# Patient Record
(demographics unavailable — no encounter records)

---

## 2025-02-10 NOTE — DISCUSSION/SUMMARY
[Normal Growth] : growth [Normal Development] : developmental [No Elimination Concerns] : elimination [Continue Regimen] : feeding [No Skin Concerns] : skin [Normal Sleep Pattern] : sleep [None] : no known medical problems [Anticipatory Guidance Given] : Anticipatory guidance addressed as per the history of present illness section [No Vaccines] : no vaccines needed [No Medications] : ~He/She~ is not on any medications [Parent/Guardian] : Parent/Guardian [FreeTextEntry1] : 4 day old female born FT via stat C/S under general anesthesia for TOLAC failure presenting for HCM. Maternal prenatal labs negative and mom recieved RSV while pregnant. Patient noted to have head circumference in 7th percentile at 24 hours. CMV sent and still pending, Head circumference today WNL. Growth and development normal. Has regained birthweight. PE remarkable for small sacral dimple with base, hymenal tag, lumbar mongoolians. Maternal depression screen passed. CCHD and hearing screens passed. NBS pending. Immunizations UTD.  [facility] - Routine  care & anticipatory guidance given - Continue ad siomara feeds at least every 3 hours - Polyvisol as prescribed - Follow up NBS - RTC 10 days for weight check and prn - RTC for 1 month HCM and prn - Discussed STRICT precautions for seeking immediate medical attention including but not limited to fever of 100.4F or more, yellowing or increased yellowing of skin or eyes, redness, discharge or foul odor from umbilical stump, poor feeding, lethargy or decreased responsiveness, fast or labored breathing, less than 5 wet diapers daily, rash or any other concerning sign or symptom.  Caretaker expressed understanding of the plan and agrees. All questions were answered.

## 2025-02-10 NOTE — HISTORY OF PRESENT ILLNESS
[Born at ___ Wks Gestation] : The patient was born at [unfilled] weeks gestation [C/S] : via  section [(1) _____] : [unfilled] [(5) _____] : [unfilled] [BW: _____] : weight of [unfilled] [DW: _____] : Discharge weight was [unfilled] [RSV vaccine] : RSV vaccine received by mother at least 14 days prior to delivery [MBT: ____] : MBT - [unfilled] [TcB: _____] : Transcutaneous Bilirubin [unfilled] mg/dL [Phototherapy Threshold: _____] : Phototherapy level per Bilitool [unfilled] (mg/dL) [Yes] : Yes [Hepatitis B Vaccine Given] : Hepatitis B vaccine given [Breast milk] : breast milk [Formula ___ oz/feed] : [unfilled] oz of formula per feed [Normal] : Normal [___ voids per day] : [unfilled] voids per day [Frequency of stools: ___] : Frequency of stools: [unfilled]  stools [Black] : black [In Bassinet/Crib] : sleeps in bassinet/crib [Loose bedding, pillow, toys, and/or bumpers in crib] : loose bedding, pillow, toys, and/or bumpers in crib [No] : No cigarette smoke exposure [Water heater temperature set at <120 degrees F] : Water heater temperature set at <120 degrees F [Rear facing car seat in back seat] : Rear facing car seat in back seat [Carbon Monoxide Detectors] : Carbon monoxide detectors at home [Smoke Detectors] : Smoke detectors at home. [NO] : No [HepBsAG] : HepBsAg negative [HIV] : HIV negative [HepC] : Hepatitis C negative [GBS] : GBS negative [de-identified] : 66 [FreeTextEntry8] : 40.5week GA AGA female born via  for unstable lie to a 28year old  mother. Admitted to [facility] for routine  care. Apgars were 9/9 at 1 and 5 minutes of life respectively. Prenatal labs are all negative, HIV Negative(1/10/25), RPR Non-reactive(1/10/25), HBsAg Negative(24), Intrapartum RPR Non-reactive (25),Rubella immune (24), GBS negative(1/10/25). Mother's blood type is A+. Maternal history includes hypercholesterolemia, anemia. UDS negative. Mom received an RSV vaccine during pregnancy.  Hepatitis B vaccine was given(25). Passed hearing B/L. TCB at 23hrs was 7.5, PT 13.1. TCB at 37 HOL was 9.8, PT 15.4, TCB at 66HOl was 14.6, PT 19.2. [facility]en[phone]. Infant received routine  care, was feeding well, stable and cleared for discharge with follow up instructions. Follow up is planned with PMD Dr. Spann at the [facility], apt 02/10 1pm.  [Exposure to electronic nicotine delivery system] : No exposure to electronic nicotine delivery system [de-identified] : 1 oz formula every 4-5 hours [FreeTextEntry1] : SDOH Screening Questionnaire SDOH (Social Determinants of Health) Questionnaire: 1. Housing: Do you worry that in the upcoming months, your family, or child, may not have a safe or stable place to live? no 2. Food security: Within the last 12 months, did the food you bought not last and you did not have money to buy more? no 3. Community: Do you need help getting public benefits like food stamps or WIC? no 4. Transportation: Does your child have chronic medical condition and therefore struggle with transportation to attend medical appointments? no 5. Healthcare Access: Do you need help getting health or dental insurance? no   Result: Negative Screen. No further intervention needed..

## 2025-02-10 NOTE — PHYSICAL EXAM
[Alert] : alert [Normocephalic] : normocephalic [Flat Open Anterior [place]] : flat open anterior fontanelle [PERRL] : PERRL [Red Reflex Bilateral] : red reflex bilateral [Normally Placed Ears] : normally placed ears [Auricles Well Formed] : auricles well formed [Clear Tympanic membranes] : clear tympanic membranes [Light reflex present] : light reflex present [Bony structures visible] : bony structures visible [Patent Auditory Canal] : patent auditory canal [Nares Patent] : nares patent [Palate Intact] : palate intact [Uvula Midline] : uvula midline [Supple, full passive range of motion] : supple, full passive range of motion [Symmetric Chest Rise] : symmetric chest rise [Clear to Auscultation Bilaterally] : clear to auscultation bilaterally [Regular Rate and Rhythm] : regular rate and rhythm [S1, S2 present] : S1, S2 present [+2 Femoral Pulses] : +2 femoral pulses [Soft] : soft [Bowel Sounds] : bowel sounds present [Umbilical Stump Dry, Clean, Intact] : umbilical stump dry, clean, intact [Normal external genitalia] : normal external genitalia [Patent Vagina] : patent vagina [Patent] : patent [Normally Placed] : normally placed [No Abnormal Lymph Nodes Palpated] : no abnormal lymph nodes palpated [Symmetric Flexed Extremities] : symmetric flexed extremities [Startle Reflex] : startle reflex present [Suck Reflex] : suck reflex present [Rooting] : rooting reflex present [Palmar Grasp] : palmar grasp present [Plantar Grasp] : plantar reflex present [Symmetric Tim] : symmetric [place] [Spinal Dimple] : spinal dimple [Acute Distress] : no acute distress [Icteric sclera] : nonicteric sclera [Discharge] : no discharge [Palpable Masses] : no palpable masses [Murmurs] : no murmurs [Tender] : nontender [Distended] : not distended [Hepatomegaly] : no hepatomegaly [Splenomegaly] : no splenomegaly [Clitoromegaly] : no clitoromegaly [Madden-Ortolani] : negative Madden-Ortolani [Tuft of Hair] : no tuft of hair [Jaundice] : not jaundice [FreeTextEntry6] : +Hymenal tag [de-identified] : + sacral dimple with dimple [de-identified] : + 2-3 large patches of [place] spots on lumbar area

## 2025-02-10 NOTE — PHYSICAL EXAM
MANSI notifying patients daughter that I have sent her message to Dr Motley for a call back regarding her mothers results.    ----- Message from Diandra sent at 11/8/2024  9:38 AM CST -----  Regarding: Results Inquiry  Type: Results Inquiry     Who Called:Lillian - daughter     Would the patient rather a call back or a response via [facility]? Call back    Best Call Back Number: [phone]    Additional Information:Patients daughter called to f/u on message sent yesterday to discuss patients results.   [Alert] : alert [Normocephalic] : normocephalic [Flat Open Anterior [place]] : flat open anterior fontanelle [PERRL] : PERRL [Red Reflex Bilateral] : red reflex bilateral [Normally Placed Ears] : normally placed ears [Auricles Well Formed] : auricles well formed [Clear Tympanic membranes] : clear tympanic membranes [Light reflex present] : light reflex present [Bony structures visible] : bony structures visible [Patent Auditory Canal] : patent auditory canal [Nares Patent] : nares patent [Palate Intact] : palate intact [Uvula Midline] : uvula midline [Supple, full passive range of motion] : supple, full passive range of motion [Symmetric Chest Rise] : symmetric chest rise [Clear to Auscultation Bilaterally] : clear to auscultation bilaterally [Regular Rate and Rhythm] : regular rate and rhythm [S1, S2 present] : S1, S2 present [+2 Femoral Pulses] : +2 femoral pulses [Soft] : soft [Bowel Sounds] : bowel sounds present Wound Care: Mupirocin [Umbilical Stump Dry, Clean, Intact] : umbilical stump dry, clean, intact [Normal external genitalia] : normal external genitalia [Patent Vagina] : patent vagina [Patent] : patent [Normally Placed] : normally placed [No Abnormal Lymph Nodes Palpated] : no abnormal lymph nodes palpated [Symmetric Flexed Extremities] : symmetric flexed extremities [Startle Reflex] : startle reflex present [Suck Reflex] : suck reflex present [Rooting] : rooting reflex present [Palmar Grasp] : palmar grasp present [Plantar Grasp] : plantar reflex present [Symmetric Tim] : symmetric [place] [Spinal Dimple] : spinal dimple [Acute Distress] : no acute distress [Icteric sclera] : nonicteric sclera [Discharge] : no discharge [Palpable Masses] : no palpable masses [Murmurs] : no murmurs [Tender] : nontender [Distended] : not distended [Hepatomegaly] : no hepatomegaly [Splenomegaly] : no splenomegaly [Clitoromegaly] : no clitoromegaly [Madden-Ortolani] : negative Madden-Ortolani [Tuft of Hair] : no tuft of hair [Jaundice] : not jaundice [FreeTextEntry6] : +Hymenal tag [de-identified] : + sacral dimple with dimple [de-identified] : + 2-3 large patches of Sw[place] spots on lumbar area

## 2025-02-10 NOTE — DISCUSSION/SUMMARY
[Normal Growth] : growth [Normal Development] : developmental [No Elimination Concerns] : elimination [Continue Regimen] : feeding [No Skin Concerns] : skin [Normal Sleep Pattern] : sleep [None] : no known medical problems [Anticipatory Guidance Given] : Anticipatory guidance addressed as per the history of present illness section [No Vaccines] : no vaccines needed [No Medications] : ~He/She~ is not on any medications [Parent/Guardian] : Parent/Guardian [FreeTextEntry1] : 4 day old female born FT via stat C/S under general anesthesia for TOLAC failure presenting for HCM. Maternal prenatal labs negative and mom recieved RSV while pregnant. Patient noted to have head circumference in 7th percentile at 24 hours. CMV sent and still pending, Head circumference today WNL. Growth and development normal. Has regained birthweight. PE remarkable for small sacral dimple with base, hymenal tag, lumbar mongoolians. Maternal depression screen passed. CCHD and hearing screens passed. NBS pending. Immunizations UTD.  [facility] - Routine  care & anticipatory guidance given - Continue ad siomara feeds at least every 3 hours - Polyvisol as prescribed - Follow up NBS - RTC 10 days for weight check and prn - RTC for 1 month HCM and prn - Discussed STRICT precautions for seeking immediate medical attention including but not limited to fever of 100.4F or more, yellowing or increased yellowing of skin or eyes, redness, discharge or foul odor from umbilical stump, poor feeding, lethargy or decreased responsiveness, fast or labored breathing, less than 5 wet diapers daily, rash or any other concerning sign or symptom.  Caretaker expressed understanding of the plan and agrees. All questions were answered.

## 2025-02-10 NOTE — HISTORY OF PRESENT ILLNESS
[Born at ___ Wks Gestation] : The patient was born at [unfilled] weeks gestation [C/S] : via  section [(1) _____] : [unfilled] [(5) _____] : [unfilled] [BW: _____] : weight of [unfilled] [DW: _____] : Discharge weight was [unfilled] [RSV vaccine] : RSV vaccine received by mother at least 14 days prior to delivery [MBT: ____] : MBT - [unfilled] [TcB: _____] : Transcutaneous Bilirubin [unfilled] mg/dL [Phototherapy Threshold: _____] : Phototherapy level per Bilitool [unfilled] (mg/dL) [Yes] : Yes [Hepatitis B Vaccine Given] : Hepatitis B vaccine given [Breast milk] : breast milk [Formula ___ oz/feed] : [unfilled] oz of formula per feed [Normal] : Normal [___ voids per day] : [unfilled] voids per day [Frequency of stools: ___] : Frequency of stools: [unfilled]  stools [Black] : black [In Bassinet/Crib] : sleeps in bassinet/crib [Loose bedding, pillow, toys, and/or bumpers in crib] : loose bedding, pillow, toys, and/or bumpers in crib [No] : No cigarette smoke exposure [Water heater temperature set at <120 degrees F] : Water heater temperature set at <120 degrees F [Rear facing car seat in back seat] : Rear facing car seat in back seat [Carbon Monoxide Detectors] : Carbon monoxide detectors at home [Smoke Detectors] : Smoke detectors at home. [NO] : No [HepBsAG] : HepBsAg negative [HIV] : HIV negative [HepC] : Hepatitis C negative [GBS] : GBS negative [de-identified] : 66 [FreeTextEntry8] : 40.5week GA AGA female born via  for unstable lie to a 28year old  mother. Admitted to [facility] for routine  care. Apgars were 9/9 at 1 and 5 minutes of life respectively. Prenatal labs are all negative, HIV Negative(1/10/25), RPR Non-reactive(1/10/25), HBsAg Negative(24), Intrapartum RPR Non-reactive (25),Rubella immune (24), GBS negative(1/10/25). Mother's blood type is A+. Maternal history includes hypercholesterolemia, anemia. UDS negative. Mom received an RSV vaccine during pregnancy.  Hepatitis B vaccine was given(25). Passed hearing B/L. TCB at 23hrs was 7.5, PT 13.1. TCB at 37 HOL was 9.8, PT 15.4, TCB at 66HOl was 14.6, PT 19.2. Congenital heart disease screening was passed. [facility]  Screening # [phone]. Infant received routine  care, was feeding well, stable and cleared for discharge with follow up instructions. Follow up is planned with PMD Dr. Spann at the [facility], apt 02/10 1pm.  [Exposure to electronic nicotine delivery system] : No exposure to electronic nicotine delivery system [de-identified] : 1 oz formula every 4-5 hours [FreeTextEntry1] : SDOH Screening Questionnaire SDOH (Social Determinants of Health) Questionnaire: 1. Housing: Do you worry that in the upcoming months, your family, or child, may not have a safe or stable place to live? no 2. Food security: Within the last 12 months, did the food you bought not last and you did not have money to buy more? no 3. Community: Do you need help getting public benefits like food stamps or WIC? no 4. Transportation: Does your child have chronic medical condition and therefore struggle with transportation to attend medical appointments? no 5. Healthcare Access: Do you need help getting health or dental insurance? no   Result: Negative Screen. No further intervention needed..

## 2025-02-16 NOTE — DISCUSSION/SUMMARY
[FreeTextEntry1] : 6 days old female, born full term via C/S for failed TOLAC presenting for Bili Check. Growth and development are appropriate. Has not regained birth weight. Weight loss is 2%, within normal range. Parent report that baby was weighed at last visit 2 days ago with diaper and clothes on. Therefore, that weight of 3.52 kg is inaccurate and not indicative of weight loss. Baby appears well hydrated. Physical exam is unremarkable. TcB was 12.1 at 141 hrs, PT 21.8, decreased from prior TSB 13.7.  - No serum bilirubin level recommended at the time.  - Follow up in 3 days  - Return precautions discussed with parents.  Reviewed with caregiver the need to offer feeds every 2-3 hours. First offer breast, then any supplemental formula or pumped breast milk until baby seems satiated. Monitor baby for increasing yellowness of skin or eyes.  Monitor number of wet and stool diapers daily. Please seek immediate medical attention for any increasing yellowness of skin or eyes, changes in behavior including irritability, fussiness, unable to calm, lethargy, limpness, poor feeding or for any other concerning sign or symptom   Patient Summary   Age at samplin hours   Total Bilirubin:	12.1 mg/dL   Bilirubin trend:		Not available (Learn more )   ETCOc:	Not provided   Gestational Age (GA):	40 weeks   Neurotoxicity Risk Factors:	No Bilirubin management summary based on  AAP guidelines  PATIENT SUMMARY: Infant age at samplin hours  Total Bilirubin: 12.1 mg/dL Bilirubin trend: Not available (sequential data not provided) ETCOc: Not provided Gestational Age: 40 weeks Additional Neurotoxicity Risk Factors: No   RECOMMENDATIONS (THRESHOLDS): Check serum bilirubin if using TcB? NO (15 mg/dL) Phototherapy? NO (21.8 mg/dL) Escalation of care? NO (25 mg/dL) Exchange transfusion? NO (27 mg/dL)  POSTDISCHARGE FOLLOW UP: For the baby 9.7 mg/dL below the phototherapy threshold (delta-TSB) at 141 hours of age  (during birth hospitalization with no prior phototherapy):   If discharging < 72 hours, then follow-up within 3 days. Recheck TSB or TcB according to clinical judgment. If discharging  72 hours, then use clinical judgment.  Generated by [email or web address] (2025 21:09:20 [facility])    Recommendations	 Recommendation	Threshold    If using TcB, confirm with TSB?	No	15 mg/dL   Phototherapy?	No	21.8 mg/dL   Escalation of Care? (More )	No	25 mg/dL   Exchange Transfusion?	No	27 mg/dL  Postdischarge Follow Up For the baby 9.7 mg/dL below the phototherapy threshold (-TSB) at 141 hours of age (during birth hospitalization with no prior phototherapy):  If discharging < 72 hours, then follow-up within 3 days. Recheck TSB or TcB according to clinical judgment. If discharging  72 hours, then use clinical judgment.

## 2025-02-16 NOTE — PHYSICAL EXAM
[Alert] : alert [Normocephalic] : normocephalic [Flat Open Anterior [place]] : flat open anterior fontanelle [PERRL] : PERRL [Red Reflex Bilateral] : red reflex bilateral [Normally Placed Ears] : normally placed ears [Auricles Well Formed] : auricles well formed [Clear Tympanic membranes] : clear tympanic membranes [Light reflex present] : light reflex present [Bony structures visible] : bony structures visible [Patent Auditory Canal] : patent auditory canal [Nares Patent] : nares patent [Palate Intact] : palate intact [Uvula Midline] : uvula midline [Supple, full passive range of motion] : supple, full passive range of motion [Symmetric Chest Rise] : symmetric chest rise [Clear to Auscultation Bilaterally] : clear to auscultation bilaterally [Regular Rate and Rhythm] : regular rate and rhythm [S1, S2 present] : S1, S2 present [+2 Femoral Pulses] : +2 femoral pulses [Soft] : soft [Bowel Sounds] : bowel sounds present [Umbilical Stump Dry, Clean, Intact] : umbilical stump dry, clean, intact [Normal external genitalia] : normal external genitalia [Patent Vagina] : patent vagina [Normally Placed] : normally placed [No Abnormal Lymph Nodes Palpated] : no abnormal lymph nodes palpated [Symmetric Flexed Extremities] : symmetric flexed extremities [Spinal Dimple] : spinal dimple [Startle Reflex] : startle reflex present [Suck Reflex] : suck reflex present [Rooting] : rooting reflex present [Palmar Grasp] : palmar grasp present [Plantar Grasp] : plantar reflex present [Symmetric Tim] : symmetric [place] [Jaundice] : jaundice [Normal External Genitalia] : normal external genitalia [Vaginal Discharge] : vaginal discharge [Patent] : patent [NL] : warm, clear [Acute Distress] : no acute distress [Icteric sclera] : nonicteric sclera [Palpable Masses] : no palpable masses [Murmurs] : no murmurs [Tender] : nontender [Distended] : not distended [Hepatomegaly] : no hepatomegaly [Splenomegaly] : no splenomegaly [Clitoromegaly] : no clitoromegaly [Madden-Ortolani] : negative Madden-Ortolani [Tuft of Hair] : no tuft of hair [Discharge] : discharge [Left] : (left) [Eyelid Swelling] : no eyelid swelling [Conjuctival Injection] : no conjunctival injection [Barrett: ____] : Barrett [unfilled] [Sacral Dimple] : scaral dimple [FreeTextEntry6] : (+) minimal bloody vaginal discharge  [de-identified] : + sacral dimple with base visible [de-identified] : Sacral Dermal melanocytosis

## 2025-02-16 NOTE — PHYSICAL EXAM
[Alert] : alert [Normocephalic] : normocephalic [Flat Open Anterior [place]] : flat open anterior fontanelle [PERRL] : PERRL [Red Reflex Bilateral] : red reflex bilateral [Normally Placed Ears] : normally placed ears [Auricles Well Formed] : auricles well formed [Clear Tympanic membranes] : clear tympanic membranes [Light reflex present] : light reflex present [Bony structures visible] : bony structures visible [Patent Auditory Canal] : patent auditory canal [Nares Patent] : nares patent [Palate Intact] : palate intact [Uvula Midline] : uvula midline [Supple, full passive range of motion] : supple, full passive range of motion [Symmetric Chest Rise] : symmetric chest rise [Clear to Auscultation Bilaterally] : clear to auscultation bilaterally [Regular Rate and Rhythm] : regular rate and rhythm [S1, S2 present] : S1, S2 present [+2 Femoral Pulses] : +2 femoral pulses [Soft] : soft [Bowel Sounds] : bowel sounds present [Umbilical Stump Dry, Clean, Intact] : umbilical stump dry, clean, intact [Normal external genitalia] : normal external genitalia [Patent Vagina] : patent vagina [Normally Placed] : normally placed [No Abnormal Lymph Nodes Palpated] : no abnormal lymph nodes palpated [Symmetric Flexed Extremities] : symmetric flexed extremities [Spinal Dimple] : spinal dimple [Startle Reflex] : startle reflex present [Suck Reflex] : suck reflex present [Rooting] : rooting reflex present [Palmar Grasp] : palmar grasp present [Plantar Grasp] : plantar reflex present [Symmetric Tim] : symmetric [place] [Jaundice] : jaundice [Normal External Genitalia] : normal external genitalia [Vaginal Discharge] : vaginal discharge [Patent] : patent [NL] : warm, clear [Acute Distress] : no acute distress [Icteric sclera] : nonicteric sclera [Palpable Masses] : no palpable masses [Murmurs] : no murmurs [Tender] : nontender [Distended] : not distended [Hepatomegaly] : no hepatomegaly [Splenomegaly] : no splenomegaly [Clitoromegaly] : no clitoromegaly [Madden-Ortolani] : negative Madden-Ortolani [Tuft of Hair] : no tuft of hair [Discharge] : discharge [Left] : (left) [Eyelid Swelling] : no eyelid swelling [Conjuctival Injection] : no conjunctival injection [Barrett: ____] : Barrett [unfilled] [Sacral Dimple] : scaral dimple [FreeTextEntry6] : (+) minimal bloody vaginal discharge  [de-identified] : + sacral dimple with base visible [de-identified] : Sacral Dermal melanocytosis

## 2025-02-16 NOTE — DISCUSSION/SUMMARY
[FreeTextEntry1] : 6 days old female, born full term via C/S for failed TOLAC presenting for Bili Check. Growth and development are appropriate. Has not regained birth weight. Weight loss is 2%, within normal range. Parent report that baby was weighed at last visit 2 days ago with diaper and clothes on. Therefore, that weight of 3.52 kg is inaccurate and not indicative of weight loss. Baby appears well hydrated. Physical exam is unremarkable. TcB was 12.1 at 141 hrs, PT 21.8, decreased from prior TSB 13.7.  - No serum bilirubin level recommended at the time.  - Follow up in 3 days  - Return precautions discussed with parents.  Reviewed with caregiver the need to offer feeds every 2-3 hours. First offer breast, then any supplemental formula or pumped breast milk until baby seems satiated. Monitor baby for increasing yellowness of skin or eyes.  Monitor number of wet and stool diapers daily. Please seek immediate medical attention for any increasing yellowness of skin or eyes, changes in behavior including irritability, fussiness, unable to calm, lethargy, limpness, poor feeding or for any other concerning sign or symptom   Patient Summary   Age at samplin hours   Total Bilirubin:	12.1 mg/dL   Bilirubin trend:		Not available (Learn more )   ETCOc:	Not provided   Gestational Age (GA):	40 weeks   Neurotoxicity Risk Factors:	No Bilirubin management summary based on  AAP guidelines  PATIENT SUMMARY: Infant age at samplin hours  Total Bilirubin: 12.1 mg/dL Bilirubin trend: Not available (sequential data not provided) ETCOc: Not provided Gestational Age: 40 weeks Additional Neurotoxicity Risk Factors: No   RECOMMENDATIONS (THRESHOLDS): Check serum bilirubin if using TcB? NO (15 mg/dL) Phototherapy? NO (21.8 mg/dL) Escalation of care? NO (25 mg/dL) Exchange transfusion? NO (27 mg/dL)  POSTDISCHARGE FOLLOW UP: For the baby 9.7 mg/dL below the phototherapy threshold (delta-TSB) at 141 hours of age  (during birth hospitalization with no prior phototherapy):   If discharging < 72 hours, then follow-up within 3 days. Recheck TSB or TcB according to clinical judgment. If discharging  72 hours, then use clinical judgment.  Generated by [email or web address] (2025 21:09:20 [facility])    Recommendations	 Recommendation	Threshold    If using TcB, confirm with TSB?	No	15 mg/dL   Phototherapy?	No	21.8 mg/dL   Escalation of Care? (More )	No	25 mg/dL   Exchange Transfusion?	No	27 mg/dL  Postdischarge Follow Up For the baby 9.7 mg/dL below the phototherapy threshold (-TSB) at 141 hours of age (during birth hospitalization with no prior phototherapy):  If discharging < 72 hours, then follow-up within 3 days. Recheck TSB or TcB according to clinical judgment. If discharging  72 hours, then use clinical judgment.

## 2025-02-16 NOTE — HISTORY OF PRESENT ILLNESS
[de-identified] : bili check [FreeTextEntry6] : 6 day old female born FT via stat C/S under general anesthesia for TOLAC failure presenting for bili check. TcB at 23 hrs was 7.5, PT 13.1 TcB at 37 hrs was 9.8, PT 15.4 TcB at 66hrs was 14.6, PT 19.2   Was seen in office 2 days ago noted to have jaundice. Serum Bili at 92 hrs was 13.7, PT 21.5  BW: 3295g TW: 3230g (-2%) GA: 40.5 weeks MBT: A+  Patient's color looks better per parent. Mother in breastfeeding every 2-3 hours, endorses good milk supply, Supplements with formula. 4-5 wet diapers and 3 stools per day.

## 2025-02-16 NOTE — HISTORY OF PRESENT ILLNESS
[de-identified] : bili check [FreeTextEntry6] : 6 day old female born FT via stat C/S under general anesthesia for TOLAC failure presenting for bili check. TcB at 23 hrs was 7.5, PT 13.1 TcB at 37 hrs was 9.8, PT 15.4 TcB at 66hrs was 14.6, PT 19.2   Was seen in office 2 days ago noted to have jaundice. Serum Bili at 92 hrs was 13.7, PT 21.5  BW: 3295g TW: 3230g (-2%) GA: 40.5 weeks MBT: A+  Patient's color looks better per parent. Mother in breastfeeding every 2-3 hours, endorses good milk supply, Supplements with formula. 4-5 wet diapers and 3 stools per day.

## 2025-02-18 NOTE — DISCUSSION/SUMMARY
[FreeTextEntry1] : 12d, ex40.5wk, presenting for follow-up weight check and bilirubin. Vitals appropriate for age. Today's weight 50g below birthweight. Although 12 DOL, patient is 2% from BW. Of note, mother is not waking infant to feed overnight. Strongly encouraged mother to feed infant q3h overnight. PE notable for jaundice and minimal dry, yellow, crusting to right eye. TCB today 7.6 at 282 HOL, with PT of 21.8.  Plan: - Limit breast-feeding to 15 mins per breast and supplement with 1-2 ounces formula after breast-feeding - Ensure feeding overnight - Polyvisol - Follow-up for weight check this Friday, 2/21. - RTC PRN

## 2025-02-18 NOTE — PHYSICAL EXAM
[NL] : normotonic [Discharge] : discharge [Right] : (right) [Eyelid Swelling] : no eyelid swelling [Conjuctival Injection] : no conjunctival injection [de-identified] : Jaundice

## 2025-02-18 NOTE — HISTORY OF PRESENT ILLNESS
[de-identified] : Weight check and TcB [FreeTextEntry6] : 12d, ex40.5wk, presenting for follow-up weight check and bilirubin. Mother reports patient is BF 30min/breast q3h, roughly 4-5 times per day and then receives 2oz formula before bed and then upon awakening in the morning. Per mother, patient falls asleep after 20 minutes of breastfeeding, and mother stimulates infant to continue feeding. Mother endorses appropriate suck, denies vomiting, URI symptoms, increased sleeping, fevers. Endorses 5-6 stools per day and 5-6 wet diapers per day. Patient takes daily Vitamin D drops. No other acute concerns.  BW - 3295g 2/8 - 3145g 2/10 - 3520g (noted to have clothes at time of weight check) 2/12 - 3230g 2/18 - 3240g

## 2025-03-02 NOTE — HISTORY OF PRESENT ILLNESS
[de-identified] : weight check [FreeTextEntry6] : 21 day old F born FT  presenting for weight check.  GA: 40.5 BW: 3295g TW: 3540g   Baby's feeds: BF 4-5 times daily, and 2 oz formula up to twice a day Wet diapers: 5-6 daily Stool diapers: 5-6 daily  Parents note that erythromycin seemed to not help eye discharge. Discharge comes and goes, does not appear to be bothersome, there is no redness of the eyelids or whites of the eyes and no swelling. Their eldest daighter had tear duct blockage and they believe that it is the same with this baby.

## 2025-03-02 NOTE — DISCUSSION/SUMMARY
[FreeTextEntry1] : 21 day old F born FT  presenting for weight check. She has regained her birthweight on mostly BFs. Adequate voids and stool. Appear vigorous with strong suck reflex. Eyes appear normal bilaterally. No evidence of conjunctivitis. Discontinue erythromycin ophthalmic ointment. 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 Recommend warm compresses prn. Seek immediate medical attention for any increasing discharge, or redness or swelling of the eye or surrounding areas.  - Routine  care & anticipatory guidance given - Continue ad siomara feeds - Polyvisol as prescribed - RTC for 1 month HCM and prn - Discussed STRICT precautions for seeking immediate medical attention including but not limited to fever of 100.4F or more, yellowing or increased yellowing of skin or eyes, redness, discharge or foul odor from umbilical stump, poor feeding, lethargy or decreased responsiveness, fast or labored breathing, less than 5 wet diapers daily, rash or any other concerning sign or symptom.  Caretaker expressed understanding of the plan and agrees. All questions were answered.

## 2025-04-24 NOTE — PHYSICAL EXAM
[Alert] : alert [Acute Distress] : no acute distress [Normocephalic] : normocephalic [Flat Open Anterior [place]] : flat open anterior fontanelle [PERRL] : PERRL [Red Reflex Bilateral] : red reflex bilateral [Normally Placed Ears] : normally placed ears [Auricles Well Formed] : auricles well formed [Clear Tympanic membranes] : clear tympanic membranes [Light reflex present] : light reflex present [Bony landmarks visible] : bony landmarks visible [Discharge] : no discharge [Nares Patent] : nares patent [Palate Intact] : palate intact [Uvula Midline] : uvula midline [Supple, full passive range of motion] : supple, full passive range of motion [Palpable Masses] : no palpable masses [Symmetric Chest Rise] : symmetric chest rise [Clear to Auscultation Bilaterally] : clear to auscultation bilaterally [Regular Rate and Rhythm] : regular rate and rhythm [S1, S2 present] : S1, S2 present [Murmurs] : no murmurs [+2 Femoral Pulses] : +2 femoral pulses [Soft] : soft [Tender] : nontender [Distended] : not distended [Bowel Sounds] : bowel sounds present [Hepatomegaly] : no hepatomegaly [Splenomegaly] : no splenomegaly [Normal external genitailia] : normal external genitalia [Clitoromegaly] : no clitoromegaly [Patent Vagina] : vagina patent [Normally Placed] : normally placed [No Abnormal Lymph Nodes Palpated] : no abnormal lymph nodes palpated [Madden-Ortolani] : negative Madden-Ortolani [Symmetric Flexed Extremities] : symmetric flexed extremities [Spinal Dimple] : no spinal dimple [Tuft of Hair] : no tuft of hair [Startle Reflex] : startle reflex present [Suck Reflex] : suck reflex present [Rooting] : rooting reflex present [Palmar Grasp] : palmar grasp reflex present [Plantar Grasp] : plantar grasp reflex present [Symmetric Tim] : symmetric [place] [Rash and/or lesion present] : no rash/lesion [de-identified] : (+) dry skin with eczematous changes on b/l extremities

## 2025-04-24 NOTE — HISTORY OF PRESENT ILLNESS
[Parents] : parents [Breast milk] : breast milk [Expressed Breast milk ___oz/feed] : [unfilled] oz of expressed breast milk per feed [___ Feeding per 24 hrs] : a  total of [unfilled] feedings in 24 hours [Normal] : Normal [Frequency of stools: ___] : Frequency of stools: [unfilled]  stools [per day] : per day. [Yellow] : yellow [Loose] : loose consistency [In Bassinet/Crib] : sleeps in bassinet/crib [On back] : sleeps on back [No] : No cigarette smoke exposure [Rear facing car seat in back seat] : Rear facing car seat in back seat [Carbon Monoxide Detectors] : Carbon monoxide detectors at home [Smoke Detectors] : Smoke detectors at home. [NO] : No [Hours between feeds ___] : Child is fed every [unfilled] hours [Vitamins ___] : Patient takes [unfilled] vitamins daily [___ voids per day] : [unfilled] voids per day [Co-sleeping] : no co-sleeping [Loose bedding, pillow, toys, and/or bumpers in crib] : no loose bedding, pillow, toys, and/or bumpers in crib [Pacifier use] : not using pacifier [Exposure to electronic nicotine delivery system] : No exposure to electronic nicotine delivery system [Water heater temperature set at <120 degrees F] : Water heater temperature not set at <120 degrees F [At risk for exposure to TB] : Not at risk for exposure to Tuberculosis  [de-identified] : Due for vaccines today. [FreeTextEntry1] :  SDOH Screening Questionnaire   SDOH (Social Determinants of Health) Questionnaire: 1. Housing: Do you worry that in the upcoming months, your family, or child, may not have a safe or stable place to live? no 2. Food security: Within the last 12 months, did the food you bought not last and you did not have money to buy more? no 3. Community: Do you need help getting public benefits like food stamps or WIC? no 4. Transportation: Does your child have chronic medical condition and therefore struggle with transportation to attend medical appointments? no 5. Healthcare Access: Do you need help getting health or dental insurance? no       Result: Negative Screen. No further intervention needed.

## 2025-04-24 NOTE — PHYSICAL EXAM
[Alert] : alert [Acute Distress] : no acute distress [Normocephalic] : normocephalic [Flat Open Anterior [place]] : flat open anterior fontanelle [PERRL] : PERRL [Red Reflex Bilateral] : red reflex bilateral [Normally Placed Ears] : normally placed ears [Auricles Well Formed] : auricles well formed [Clear Tympanic membranes] : clear tympanic membranes [Light reflex present] : light reflex present [Bony landmarks visible] : bony landmarks visible [Discharge] : no discharge [Nares Patent] : nares patent [Palate Intact] : palate intact [Uvula Midline] : uvula midline [Supple, full passive range of motion] : supple, full passive range of motion [Palpable Masses] : no palpable masses [Symmetric Chest Rise] : symmetric chest rise [Clear to Auscultation Bilaterally] : clear to auscultation bilaterally [Regular Rate and Rhythm] : regular rate and rhythm [S1, S2 present] : S1, S2 present [Murmurs] : no murmurs [+2 Femoral Pulses] : +2 femoral pulses [Soft] : soft [Tender] : nontender [Distended] : not distended [Bowel Sounds] : bowel sounds present [Hepatomegaly] : no hepatomegaly [Splenomegaly] : no splenomegaly [Normal external genitailia] : normal external genitalia [Clitoromegaly] : no clitoromegaly [Patent Vagina] : vagina patent [Normally Placed] : normally placed [No Abnormal Lymph Nodes Palpated] : no abnormal lymph nodes palpated [Madden-Ortolani] : negative Madden-Ortolani [Symmetric Flexed Extremities] : symmetric flexed extremities [Spinal Dimple] : no spinal dimple [Tuft of Hair] : no tuft of hair [Startle Reflex] : startle reflex present [Suck Reflex] : suck reflex present [Rooting] : rooting reflex present [Palmar Grasp] : palmar grasp reflex present [Plantar Grasp] : plantar grasp reflex present [Symmetric Tim] : symmetric [place] [Rash and/or lesion present] : no rash/lesion [de-identified] : (+) dry skin with eczematous changes on b/l extremities

## 2025-04-24 NOTE — HISTORY OF PRESENT ILLNESS
[Parents] : parents [Breast milk] : breast milk [Expressed Breast milk ___oz/feed] : [unfilled] oz of expressed breast milk per feed [___ Feeding per 24 hrs] : a  total of [unfilled] feedings in 24 hours [Normal] : Normal [Frequency of stools: ___] : Frequency of stools: [unfilled]  stools [per day] : per day. [Yellow] : yellow [Loose] : loose consistency [In Bassinet/Crib] : sleeps in bassinet/crib [On back] : sleeps on back [No] : No cigarette smoke exposure [Rear facing car seat in back seat] : Rear facing car seat in back seat [Carbon Monoxide Detectors] : Carbon monoxide detectors at home [Smoke Detectors] : Smoke detectors at home. [NO] : No [Hours between feeds ___] : Child is fed every [unfilled] hours [Vitamins ___] : Patient takes [unfilled] vitamins daily [___ voids per day] : [unfilled] voids per day [Co-sleeping] : no co-sleeping [Loose bedding, pillow, toys, and/or bumpers in crib] : no loose bedding, pillow, toys, and/or bumpers in crib [Pacifier use] : not using pacifier [Exposure to electronic nicotine delivery system] : No exposure to electronic nicotine delivery system [Water heater temperature set at <120 degrees F] : Water heater temperature not set at <120 degrees F [At risk for exposure to TB] : Not at risk for exposure to Tuberculosis  [de-identified] : Due for vaccines today. [FreeTextEntry1] :  SDOH Screening Questionnaire   SDOH (Social Determinants of Health) Questionnaire: 1. Housing: Do you worry that in the upcoming months, your family, or child, may not have a safe or stable place to live? no 2. Food security: Within the last 12 months, did the food you bought not last and you did not have money to buy more? no 3. Community: Do you need help getting public benefits like food stamps or WIC? no 4. Transportation: Does your child have chronic medical condition and therefore struggle with transportation to attend medical appointments? no 5. Healthcare Access: Do you need help getting health or dental insurance? no       Result: Negative Screen. No further intervention needed.

## 2025-04-24 NOTE — DISCUSSION/SUMMARY
[FreeTextEntry1] : 2 month old F presenting for HCM. Growth and development normal. PE remarkable for dry skin with eczematous changes. Maternal depression screen passed. Immunizations due today.  - Routine care & anticipatory guidance given - Vaccines given: Pediarix, Hib,Prevnar & Rotarix - Post vaccine care discussed & potential side effects reviewed - Hydrocortisone 1% cream BID to be applied for eczema - Tylenol every 4 hours prn for fever or pain. Sent to pharmacy. - Continue ad siomara feeds with vitamin D supplementation - RTC for 4 month old HCM and prn  Caretaker expressed understanding of the plan and agrees. All questions were answered.

## 2025-06-14 NOTE — PHYSICAL EXAM
[Alert] : alert [Acute Distress] : no acute distress [Normocephalic] : normocephalic [Flat Open Anterior [place]] : flat open anterior fontanelle [Red Reflex] : red reflex bilateral [PERRL] : PERRL [Normally Placed Ears] : normally placed ears [Auricles Well Formed] : auricles well formed [Clear Tympanic membranes] : clear tympanic membranes [Light reflex present] : light reflex present [Bony landmarks visible] : bony landmarks visible [Discharge] : no discharge [Nares Patent] : nares patent [Palate Intact] : palate intact [Uvula Midline] : uvula midline [Palpable Masses] : no palpable masses [Symmetric Chest Rise] : symmetric chest rise [Clear to Auscultation Bilaterally] : clear to auscultation bilaterally [Regular Rate and Rhythm] : regular rate and rhythm [S1, S2 present] : S1, S2 present [Murmurs] : no murmurs [+2 Femoral Pulses] : (+) 2 femoral pulses [Soft] : soft [Tender] : nontender [Distended] : nondistended [Bowel Sounds] : bowel sounds present [Hepatomegaly] : no hepatomegaly [Splenomegaly] : no splenomegaly [Normal External Genitalia] : normal external genitalia [Clitoromegaly] : no clitoromegaly [Normal Vaginal Introitus] : normal vaginal introitus [Patent] : patent [Normally Placed] : normally placed [No Abnormal Lymph Nodes Palpated] : no abnormal lymph nodes palpated [Madden-Ortolani] : negative Madden-Ortolani [Allis Sign] : negative Allis sign [Spinal Dimple] : no spinal dimple [Tuft of Hair] : no tuft of hair [Startle Reflex] : startle reflex present [Plantar Grasp] : plantar grasp reflex present [Symmetric Tim] : symmetric tim [Rash or Lesions] : no rash/lesions

## 2025-06-14 NOTE — DISCUSSION/SUMMARY
[Normal Growth] : growth [Normal Development] : development  [No Elimination Concerns] : elimination [Continue Regimen] : feeding [No Skin Concerns] : skin [Normal Sleep Pattern] : sleep [None] : no medical problems [Anticipatory Guidance Given] : Anticipatory guidance addressed as per the history of present illness section [Family Functioning] : family functioning [Nutritional Adequacy and Growth] : nutritional adequacy and growth [Infant Development] : infant development [Oral Health] : oral health [Safety] : safety [Age Approp Vaccines] : Age appropriate vaccines administered [Parent/Guardian] : Parent/Guardian [] : The components of the vaccine(s) to be administered today are listed in the plan of care. The disease(s) for which the vaccine(s) are intended to prevent and the risks have been discussed with the caretaker.  The risks are also included in the appropriate vaccination information statements which have been provided to the patient's caregiver.  The caregiver has given consent to vaccinate. [FreeTextEntry1] :  4 month female presenting for [facility]. Growth and development normal. Maternal depression screen passed. Immunizations due.  PLAN HCM - Routine care & anticipatory guidance given - Vaccines given: Pentacel, Prevnar & Rotarix - Post vaccine care discussed & potential side effects reviewed - Tylenol every 4 hours prn for fever or pain - Continue ad siomara feeds - Exclusively : hold on to intro to solid - STOP polyvisol and START polyvisol with iron - RTC for 6 month old HCM and prn  Caretaker expressed understanding of the plan and agrees. All questions were answered.

## 2025-06-14 NOTE — HISTORY OF PRESENT ILLNESS
[Vitamins ___] : Patient takes [unfilled] vitamins daily [Fruits] : no fruits [Vegetables] : no vegetables [Cereal] : no cereal [Co-sleeping] : no co-sleeping [Loose bedding, pillow, toys, and/or bumpers in crib] : no loose bedding, pillow, toys, and/or bumpers in crib [Pacifier use] : not using pacifier [No] : No cigarette smoke exposure [Exposure to electronic nicotine delivery system] : No exposure to electronic nicotine delivery system [Water heater temperature set at <120 degrees F] : Water heater temperature set at <120 degrees F [FreeTextEntry1] : SDOH Screening Questionnaire   SDOH (Social Determinants of Health) Questionnaire: 1. Housing: Do you worry that in the upcoming months, your family, or child, may not have a safe or stable place to live? no 2. Food security: Within the last 12 months, did the food you bought not last and you did not have money to buy more? no 3. Community: Do you need help getting public benefits like food stamps or WIC? no 4. Transportation: Does your child have chronic medical condition and therefore struggle with transportation to attend medical appointments? no 5. Healthcare Access: Do you need help getting health or dental insurance? no       Result: Negative Screen. No further intervention needed.

## 2025-06-14 NOTE — DISCUSSION/SUMMARY
[Normal Growth] : growth [Normal Development] : development  [No Elimination Concerns] : elimination [Continue Regimen] : feeding [No Skin Concerns] : skin [Normal Sleep Pattern] : sleep [None] : no medical problems [Family Functioning] : family functioning [Anticipatory Guidance Given] : Anticipatory guidance addressed as per the history of present illness section [Nutritional Adequacy and Growth] : nutritional adequacy and growth [Infant Development] : infant development [Oral Health] : oral health [Safety] : safety [Age Approp Vaccines] : Age appropriate vaccines administered [Parent/Guardian] : Parent/Guardian [] : The components of the vaccine(s) to be administered today are listed in the plan of care. The disease(s) for which the vaccine(s) are intended to prevent and the risks have been discussed with the caretaker.  The risks are also included in the appropriate vaccination information statements which have been provided to the patient's caregiver.  The caregiver has given consent to vaccinate. [FreeTextEntry1] :  4 month female presenting for [facility]. Growth and development normal. Maternal depression screen passed. Immunizations due.  PLAN HCM - Routine care & anticipatory guidance given - Vaccines given: Pentacel, Prevnar & Rotarix - Post vaccine care discussed & potential side effects reviewed - Tylenol every 4 hours prn for fever or pain - Continue ad siomara feeds - Exclusively : hold on to intro to solid - STOP polyvisol and START polyvisol with iron - RTC for 6 month old HCM and prn  Caretaker expressed understanding of the plan and agrees. All questions were answered.

## 2025-06-14 NOTE — PHYSICAL EXAM
[Alert] : alert [Acute Distress] : no acute distress [Normocephalic] : normocephalic [Flat Open Anterior [place]] : flat open anterior fontanelle [Red Reflex] : red reflex bilateral [PERRL] : PERRL [Normally Placed Ears] : normally placed ears [Auricles Well Formed] : auricles well formed [Clear Tympanic membranes] : clear tympanic membranes [Light reflex present] : light reflex present [Bony landmarks visible] : bony landmarks visible [Discharge] : no discharge [Nares Patent] : nares patent [Palate Intact] : palate intact [Uvula Midline] : uvula midline [Palpable Masses] : no palpable masses [Symmetric Chest Rise] : symmetric chest rise [Clear to Auscultation Bilaterally] : clear to auscultation bilaterally [Regular Rate and Rhythm] : regular rate and rhythm [S1, S2 present] : S1, S2 present [Murmurs] : no murmurs [+2 Femoral Pulses] : (+) 2 femoral pulses [Soft] : soft [Tender] : nontender [Distended] : nondistended [Bowel Sounds] : bowel sounds present [Hepatomegaly] : no hepatomegaly [Splenomegaly] : no splenomegaly [Normal External Genitalia] : normal external genitalia [Clitoromegaly] : no clitoromegaly [Patent] : patent [Normal Vaginal Introitus] : normal vaginal introitus [Normally Placed] : normally placed [No Abnormal Lymph Nodes Palpated] : no abnormal lymph nodes palpated [Madden-Ortolani] : negative Madden-Ortolani [Allis Sign] : negative Allis sign [Spinal Dimple] : no spinal dimple [Tuft of Hair] : no tuft of hair [Startle Reflex] : startle reflex present [Plantar Grasp] : plantar grasp reflex present [Symmetric Tim] : symmetric itm [Rash or Lesions] : no rash/lesions